# Patient Record
Sex: MALE | URBAN - METROPOLITAN AREA
[De-identification: names, ages, dates, MRNs, and addresses within clinical notes are randomized per-mention and may not be internally consistent; named-entity substitution may affect disease eponyms.]

---

## 2018-03-09 ENCOUNTER — RECORDS - HEALTHEAST (OUTPATIENT)
Dept: LAB | Facility: CLINIC | Age: 28
End: 2018-03-09

## 2018-03-12 LAB
C TRACH DNA SPEC QL PROBE+SIG AMP: NEGATIVE
N GONORRHOEA DNA SPEC QL NAA+PROBE: NEGATIVE

## 2019-10-04 ENCOUNTER — HOSPITAL ENCOUNTER (INPATIENT)
Age: 29
LOS: 2 days | Discharge: HOME | DRG: 753 | End: 2019-10-07
Attending: EMERGENCY MEDICINE | Admitting: PSYCHIATRY & NEUROLOGY

## 2019-10-04 ENCOUNTER — APPOINTMENT (OUTPATIENT)
Dept: GENERAL RADIOLOGY | Age: 29
DRG: 753 | End: 2019-10-04
Attending: PHYSICIAN ASSISTANT

## 2019-10-04 DIAGNOSIS — R45.851 SUICIDAL IDEATION: Primary | ICD-10-CM

## 2019-10-04 LAB
ALBUMIN SERPL-MCNC: 3.7 G/DL (ref 3.6–5.1)
ALBUMIN/GLOB SERPL: 1.1 {RATIO} (ref 1–2.4)
ALP SERPL-CCNC: 66 UNITS/L (ref 45–117)
ALT SERPL-CCNC: 19 UNITS/L
ANION GAP SERPL CALC-SCNC: 12 MMOL/L (ref 10–20)
AST SERPL-CCNC: 14 UNITS/L
BASOPHILS # BLD AUTO: 0 K/MCL (ref 0–0.3)
BASOPHILS NFR BLD AUTO: 0 %
BILIRUB SERPL-MCNC: 0.3 MG/DL (ref 0.2–1)
BUN SERPL-MCNC: 8 MG/DL (ref 6–20)
BUN/CREAT SERPL: 10 (ref 7–25)
CALCIUM SERPL-MCNC: 8.5 MG/DL (ref 8.4–10.2)
CHLORIDE SERPL-SCNC: 106 MMOL/L (ref 98–107)
CO2 SERPL-SCNC: 25 MMOL/L (ref 21–32)
CREAT SERPL-MCNC: 0.83 MG/DL (ref 0.67–1.17)
DIFFERENTIAL METHOD BLD: NORMAL
EOSINOPHIL # BLD AUTO: 0.1 K/MCL (ref 0.1–0.5)
EOSINOPHIL NFR SPEC: 1 %
ERYTHROCYTE [DISTWIDTH] IN BLOOD: 12.7 % (ref 11–15)
ETHANOL SERPL-MCNC: NORMAL MG/DL
GLOBULIN SER-MCNC: 3.3 G/DL (ref 2–4)
GLUCOSE SERPL-MCNC: 86 MG/DL (ref 65–99)
HCT VFR BLD CALC: 41.5 % (ref 39–51)
HGB BLD-MCNC: 13.6 G/DL (ref 13–17)
IMM GRANULOCYTES # BLD AUTO: 0 K/MCL (ref 0–0.2)
IMM GRANULOCYTES NFR BLD: 0 %
LIPASE SERPL-CCNC: <50 UNITS/L (ref 73–393)
LYMPHOCYTES # BLD MANUAL: 2.8 K/MCL (ref 1–4.8)
LYMPHOCYTES NFR BLD MANUAL: 38 %
MAGNESIUM SERPL-MCNC: 2.1 MG/DL (ref 1.7–2.4)
MCH RBC QN AUTO: 29.5 PG (ref 26–34)
MCHC RBC AUTO-ENTMCNC: 32.8 G/DL (ref 32–36.5)
MCV RBC AUTO: 90 FL (ref 78–100)
MONOCYTES # BLD MANUAL: 0.8 K/MCL (ref 0.3–0.9)
MONOCYTES NFR BLD MANUAL: 11 %
NEUTROPHILS # BLD: 3.6 K/MCL (ref 1.8–7.7)
NEUTROPHILS NFR BLD AUTO: 50 %
NRBC BLD MANUAL-RTO: 0 /100 WBC
PLATELET # BLD: 241 K/MCL (ref 140–450)
POTASSIUM SERPL-SCNC: 3.4 MMOL/L (ref 3.4–5.1)
PROT SERPL-MCNC: 7 G/DL (ref 6.4–8.2)
RBC # BLD: 4.61 MIL/MCL (ref 4.5–5.9)
SODIUM SERPL-SCNC: 140 MMOL/L (ref 135–145)
TROPONIN I BLD-MCNC: <0.1 NG/ML
WBC # BLD: 7.4 K/MCL (ref 4.2–11)

## 2019-10-04 PROCEDURE — 80329 ANALGESICS NON-OPIOID 1 OR 2: CPT

## 2019-10-04 PROCEDURE — 36415 COLL VENOUS BLD VENIPUNCTURE: CPT

## 2019-10-04 PROCEDURE — 99285 EMERGENCY DEPT VISIT HI MDM: CPT

## 2019-10-04 PROCEDURE — 71045 X-RAY EXAM CHEST 1 VIEW: CPT | Performed by: RADIOLOGY

## 2019-10-04 PROCEDURE — 71045 X-RAY EXAM CHEST 1 VIEW: CPT

## 2019-10-04 PROCEDURE — 83690 ASSAY OF LIPASE: CPT

## 2019-10-04 PROCEDURE — 93005 ELECTROCARDIOGRAM TRACING: CPT | Performed by: PHYSICIAN ASSISTANT

## 2019-10-04 PROCEDURE — 84484 ASSAY OF TROPONIN QUANT: CPT

## 2019-10-04 PROCEDURE — 80053 COMPREHEN METABOLIC PANEL: CPT

## 2019-10-04 PROCEDURE — 85025 COMPLETE CBC W/AUTO DIFF WBC: CPT

## 2019-10-04 PROCEDURE — 83735 ASSAY OF MAGNESIUM: CPT

## 2019-10-04 PROCEDURE — 80320 DRUG SCREEN QUANTALCOHOLS: CPT

## 2019-10-04 ASSESSMENT — ENCOUNTER SYMPTOMS
HEADACHES: 0
APPETITE CHANGE: 0
VOMITING: 0
NAUSEA: 0
WEAKNESS: 0
CHILLS: 0
CONSTIPATION: 0
ABDOMINAL PAIN: 0
DIARRHEA: 0
SORE THROAT: 0
COUGH: 0
FEVER: 0
HALLUCINATIONS: 0
BACK PAIN: 0
EYE REDNESS: 0
SHORTNESS OF BREATH: 0
BLOOD IN STOOL: 0

## 2019-10-04 ASSESSMENT — PAIN SCALES - GENERAL
PAINLEVEL_OUTOF10: 0
PAINLEVEL_OUTOF10: 0

## 2019-10-05 LAB
AMPHETAMINES UR QL: POSITIVE
APAP SERPL-MCNC: <2 MCG/ML (ref 10–30)
ATRIAL RATE (BPM): 91
BARBITURATES UR QL: NEGATIVE
BENZODIAZ UR QL: NEGATIVE
BZE UR QL: NEGATIVE
CANNABINOIDS UR QL SCN: POSITIVE
OPIATES UR QL: NEGATIVE
P AXIS (DEGREES): 50
PCP UR QL: NEGATIVE
PR-INTERVAL (MSEC): 132
QRS-INTERVAL (MSEC): 88
QT-INTERVAL (MSEC): 368
QTC: 453
R AXIS (DEGREES): 64
REPORT TEXT: NORMAL
SALICYLATES SERPL-MCNC: 2.9 MG/DL
T AXIS (DEGREES): 49
VENTRICULAR RATE EKG/MIN (BPM): 91

## 2019-10-05 PROCEDURE — 90792 PSYCH DIAG EVAL W/MED SRVCS: CPT | Performed by: PSYCHIATRY & NEUROLOGY

## 2019-10-05 PROCEDURE — 80307 DRUG TEST PRSMV CHEM ANLYZR: CPT

## 2019-10-05 PROCEDURE — 99252 IP/OBS CONSLTJ NEW/EST SF 35: CPT | Performed by: INTERNAL MEDICINE

## 2019-10-05 PROCEDURE — 10004577 HB ROOM CHARGE PSYCH

## 2019-10-05 PROCEDURE — 10004125 HB COUNTER-FIRST DAY ADMIT

## 2019-10-05 PROCEDURE — 10002803 HB RX 637: Performed by: PSYCHIATRY & NEUROLOGY

## 2019-10-05 PROCEDURE — 10002803 HB RX 637: Performed by: INTERNAL MEDICINE

## 2019-10-05 RX ORDER — ZOLPIDEM TARTRATE 5 MG/1
5 TABLET ORAL NIGHTLY PRN
Status: DISCONTINUED | OUTPATIENT
Start: 2019-10-05 | End: 2019-10-07 | Stop reason: HOSPADM

## 2019-10-05 RX ORDER — CLONAZEPAM 0.5 MG/1
0.5 TABLET ORAL EVERY 12 HOURS SCHEDULED
Status: DISCONTINUED | OUTPATIENT
Start: 2019-10-05 | End: 2019-10-07 | Stop reason: HOSPADM

## 2019-10-05 RX ORDER — NICOTINE 21 MG/24HR
1 PATCH, TRANSDERMAL 24 HOURS TRANSDERMAL DAILY
Status: DISCONTINUED | OUTPATIENT
Start: 2019-10-05 | End: 2019-10-07 | Stop reason: HOSPADM

## 2019-10-05 RX ORDER — BENZTROPINE MESYLATE 1 MG/1
1 TABLET ORAL EVERY 4 HOURS PRN
Status: DISCONTINUED | OUTPATIENT
Start: 2019-10-05 | End: 2019-10-07 | Stop reason: HOSPADM

## 2019-10-05 RX ORDER — LOPERAMIDE HYDROCHLORIDE 2 MG/1
2 CAPSULE ORAL PRN
Status: DISCONTINUED | OUTPATIENT
Start: 2019-10-05 | End: 2019-10-07 | Stop reason: HOSPADM

## 2019-10-05 RX ORDER — ONDANSETRON 4 MG/1
4 TABLET, ORALLY DISINTEGRATING ORAL 2 TIMES DAILY PRN
Status: DISCONTINUED | OUTPATIENT
Start: 2019-10-05 | End: 2019-10-07 | Stop reason: HOSPADM

## 2019-10-05 RX ORDER — LORAZEPAM 2 MG/ML
1 INJECTION INTRAMUSCULAR EVERY 4 HOURS PRN
Status: DISCONTINUED | OUTPATIENT
Start: 2019-10-05 | End: 2019-10-07 | Stop reason: HOSPADM

## 2019-10-05 RX ORDER — MAGNESIUM HYDROXIDE/ALUMINUM HYDROXICE/SIMETHICONE 120; 1200; 1200 MG/30ML; MG/30ML; MG/30ML
30 SUSPENSION ORAL EVERY 4 HOURS PRN
Status: DISCONTINUED | OUTPATIENT
Start: 2019-10-05 | End: 2019-10-07 | Stop reason: HOSPADM

## 2019-10-05 RX ORDER — OLANZAPINE 5 MG/1
5 TABLET, ORALLY DISINTEGRATING ORAL
Status: DISCONTINUED | OUTPATIENT
Start: 2019-10-05 | End: 2019-10-07 | Stop reason: HOSPADM

## 2019-10-05 RX ORDER — AMOXICILLIN 250 MG
2 CAPSULE ORAL 2 TIMES DAILY PRN
Status: DISCONTINUED | OUTPATIENT
Start: 2019-10-05 | End: 2019-10-07 | Stop reason: HOSPADM

## 2019-10-05 RX ORDER — LORAZEPAM 1 MG/1
1 TABLET ORAL EVERY 4 HOURS PRN
Status: DISCONTINUED | OUTPATIENT
Start: 2019-10-05 | End: 2019-10-07 | Stop reason: HOSPADM

## 2019-10-05 RX ORDER — BENZTROPINE MESYLATE 1 MG/ML
1 INJECTION INTRAMUSCULAR; INTRAVENOUS EVERY 4 HOURS PRN
Status: DISCONTINUED | OUTPATIENT
Start: 2019-10-05 | End: 2019-10-07 | Stop reason: HOSPADM

## 2019-10-05 RX ORDER — GABAPENTIN 300 MG/1
300 CAPSULE ORAL 3 TIMES DAILY
Status: ON HOLD | COMMUNITY
End: 2019-10-07 | Stop reason: SDUPTHER

## 2019-10-05 RX ORDER — POLYETHYLENE GLYCOL 3350 17 G/17G
17 POWDER, FOR SOLUTION ORAL DAILY PRN
Status: DISCONTINUED | OUTPATIENT
Start: 2019-10-05 | End: 2019-10-07 | Stop reason: HOSPADM

## 2019-10-05 RX ORDER — ACETAMINOPHEN 325 MG/1
650 TABLET ORAL EVERY 4 HOURS PRN
Status: DISCONTINUED | OUTPATIENT
Start: 2019-10-05 | End: 2019-10-07 | Stop reason: HOSPADM

## 2019-10-05 RX ORDER — ARIPIPRAZOLE 5 MG/1
5 TABLET ORAL DAILY
Status: ON HOLD | COMMUNITY
End: 2019-10-07 | Stop reason: HOSPADM

## 2019-10-05 RX ORDER — GABAPENTIN 300 MG/1
300 CAPSULE ORAL 3 TIMES DAILY
Status: DISCONTINUED | OUTPATIENT
Start: 2019-10-05 | End: 2019-10-07

## 2019-10-05 RX ORDER — HYDROXYZINE HYDROCHLORIDE 25 MG/1
50 TABLET, FILM COATED ORAL EVERY 4 HOURS PRN
Status: DISCONTINUED | OUTPATIENT
Start: 2019-10-05 | End: 2019-10-07 | Stop reason: HOSPADM

## 2019-10-05 RX ORDER — HALOPERIDOL 5 MG/ML
5 INJECTION INTRAMUSCULAR
Status: DISCONTINUED | OUTPATIENT
Start: 2019-10-05 | End: 2019-10-07 | Stop reason: HOSPADM

## 2019-10-05 RX ORDER — HALOPERIDOL 5 MG/1
10 TABLET ORAL
Status: DISCONTINUED | OUTPATIENT
Start: 2019-10-05 | End: 2019-10-07 | Stop reason: HOSPADM

## 2019-10-05 RX ADMIN — NICOTINE 1 PATCH: 21 PATCH TRANSDERMAL at 08:45

## 2019-10-05 RX ADMIN — GABAPENTIN 300 MG: 300 CAPSULE ORAL at 13:02

## 2019-10-05 RX ADMIN — CLONAZEPAM 0.5 MG: 0.5 TABLET ORAL at 20:43

## 2019-10-05 RX ADMIN — GABAPENTIN 300 MG: 300 CAPSULE ORAL at 20:43

## 2019-10-05 RX ADMIN — CLONAZEPAM 0.5 MG: 0.5 TABLET ORAL at 13:02

## 2019-10-05 ASSESSMENT — COGNITIVE AND FUNCTIONAL STATUS - GENERAL
LEVEL_OF_CONSCIOUSNESS_CALCULATED: HYPERALERT
AFFECT: FLAT;IRRITABLE
MEMORY: INTACT
ARE YOU BLIND OR DO YOU HAVE SERIOUS DIFFICULTY SEEING, EVEN WHEN WEARING GLASSES: NO
BEHAVIOR: SUICIDAL/SUICIDAL IDEATION
SPEECH: GUARDED
ORIENTATION: ORIENTED (PERSON/PLACE/TIME)
MOOD: IRRITABLE
ARE YOU DEAF OR DO YOU HAVE SERIOUS DIFFICULTY  HEARING: NO

## 2019-10-05 ASSESSMENT — ACTIVITIES OF DAILY LIVING (ADL)
ADL_SHORT_OF_BREATH: NO
RECENT_DECLINE_ADL: NO
CHRONIC_PAIN_PRESENT: YES, CHRONIC
DESCRIBE HOW PAIN IMPACTS YOUR LIFE: NONE/CAN MANAGE
ADL_SCORE: 12
ADL_BEFORE_ADMISSION: INDEPENDENT

## 2019-10-05 ASSESSMENT — COLUMBIA-SUICIDE SEVERITY RATING SCALE - C-SSRS
LETHALITY/MEDICAL DAMAGE CODE MOST RECENT ACTUAL ATTEMPT: MINOR PHYSICAL DAMAGE
REASONS FOR IDEATION LIFETIME: COMPLETELY TO END OR STOP THE PAIN (YOU COULDN'T GO ON LIVING WITH THE PAIN OR HOW YOU WERE FEELING)
TOTAL  NUMBER OF INTERRUPTED ATTEMPTS LIFETIME: NO
TOTAL  NUMBER OF INTERRUPTED ATTEMPTS PAST 3 MONTHS: NO
ATTEMPT PAST THREE MONTHS: NO
6. HAVE YOU EVER DONE ANYTHING, STARTED TO DO ANYTHING, OR PREPARED TO DO ANYTHING TO END YOUR LIFE?: NO
REASONS FOR IDEATION PAST MONTH: EQUALLY TO GET ATTENTION, REVENGE OR A REACTION FROM OTHERS AND TO END/STOP THE PAIN
TOTAL  NUMBER OF ABORTED OR SELF INTERRUPTED ATTEMPTS PAST 3 MONTHS: NO
ATTEMPT LIFETIME: YES
6. HAVE YOU EVER DONE ANYTHING, STARTED TO DO ANYTHING, OR PREPARED TO DO ANYTHING TO END YOUR LIFE?: NO

## 2019-10-05 ASSESSMENT — LIFESTYLE VARIABLES
ALCOHOL_USE_STATUS: NO OR LOW RISK WITH VALIDATED TOOL
HOW MANY STANDARD DRINKS CONTAINING ALCOHOL DO YOU HAVE ON A TYPICAL DAY: 0,1 OR 2
HOW OFTEN DO YOU HAVE A DRINK CONTAINING ALCOHOL: NEVER
HOW OFTEN DO YOU HAVE 6 OR MORE DRINKS ON ONE OCCASION: NEVER
HOW MANY STANDARD DRINKS CONTAINING ALCOHOL DO YOU HAVE ON A TYPICAL DAY: 0,1 OR 2
AUDIT-C TOTAL SCORE: 0
AUDIT-C TOTAL SCORE: 0
ALCOHOL_USE_STATUS: NO OR LOW RISK WITH VALIDATED TOOL
HAVE YOU EVER BEEN EXPOSED TO OTHER VERY DISTURBING, TRAUMATIC OR ANXIETY PROVOKING EVENTS IN YOUR LIFETIME?: NO
HAVE YOU EVER BEEN VERBALLY, EMOTIONALLY, PHYSICALLY OR SEXUALLY ABUSED, OR ABUSED VIA SOCIAL MEDIA?: NO
ALCOHOL_USE: DENIES
HOW OFTEN DO YOU HAVE 6 OR MORE DRINKS ON ONE OCCASION: NEVER
HOW OFTEN DO YOU HAVE A DRINK CONTAINING ALCOHOL: NEVER

## 2019-10-05 ASSESSMENT — PAIN SCALES - GENERAL
PAINLEVEL_OUTOF10: 8
PAINLEVEL_OUTOF10: 0
PAINLEVEL_OUTOF10: 7
PAINLEVEL_OUTOF10: 0

## 2019-10-06 PROCEDURE — 10002803 HB RX 637: Performed by: INTERNAL MEDICINE

## 2019-10-06 PROCEDURE — 10004577 HB ROOM CHARGE PSYCH

## 2019-10-06 PROCEDURE — 10002803 HB RX 637: Performed by: PSYCHIATRY & NEUROLOGY

## 2019-10-06 PROCEDURE — 99232 SBSQ HOSP IP/OBS MODERATE 35: CPT | Performed by: PSYCHIATRY & NEUROLOGY

## 2019-10-06 PROCEDURE — 10004185 HB COUNTER-VISIT  CENSUS

## 2019-10-06 RX ADMIN — GABAPENTIN 300 MG: 300 CAPSULE ORAL at 09:10

## 2019-10-06 RX ADMIN — NICOTINE POLACRILEX 2 MG: 2 GUM, CHEWING BUCCAL at 18:48

## 2019-10-06 RX ADMIN — CLONAZEPAM 0.5 MG: 0.5 TABLET ORAL at 20:31

## 2019-10-06 RX ADMIN — GABAPENTIN 300 MG: 300 CAPSULE ORAL at 13:35

## 2019-10-06 RX ADMIN — ZOLPIDEM TARTRATE 5 MG: 5 TABLET, COATED ORAL at 20:35

## 2019-10-06 RX ADMIN — GABAPENTIN 300 MG: 300 CAPSULE ORAL at 20:31

## 2019-10-06 RX ADMIN — NICOTINE 1 PATCH: 21 PATCH TRANSDERMAL at 09:10

## 2019-10-06 RX ADMIN — CLONAZEPAM 0.5 MG: 0.5 TABLET ORAL at 09:10

## 2019-10-06 RX ADMIN — LORAZEPAM 1 MG: 1 TABLET ORAL at 17:40

## 2019-10-06 RX ADMIN — NICOTINE POLACRILEX 2 MG: 2 GUM, CHEWING BUCCAL at 12:32

## 2019-10-06 ASSESSMENT — PATIENT HEALTH QUESTIONNAIRE - PHQ9
SUM OF ALL RESPONSES TO PHQ QUESTIONS 1-9: 26
4. FEELING TIRED OR HAVING LITTLE ENERGY: NEARLY EVERY DAY
6. FEELING BAD ABOUT YOURSELF - OR THAT YOU ARE A FAILURE OR HAVE LET YOURSELF OR YOUR FAMILY DOWN: NEARLY EVERY DAY
8. MOVING OR SPEAKING SO SLOWLY THAT OTHER PEOPLE COULD HAVE NOTICED. OR THE OPPOSITE, BEING SO FIGETY OR RESTLESS THAT YOU HAVE BEEN MOVING AROUND A LOT MORE THAN USUAL: NEARLY EVERY DAY
9. THOUGHTS THAT YOU WOULD BE BETTER OFF DEAD, OR OF HURTING YOURSELF: NEARLY EVERY DAY
1. LITTLE INTEREST OR PLEASURE IN DOING THINGS: NEARLY EVERY DAY
5. POOR APPETITE OR OVEREATING: NEARLY EVERY DAY
7. TROUBLE CONCENTRATING ON THINGS, SUCH AS READING THE NEWSPAPER OR WATCHING TELEVISION: NEARLY EVERY DAY
3. TROUBLE FALLING OR STAYING ASLEEP OR SLEEPING TOO MUCH: NEARLY EVERY DAY
10. IF YOU CHECKED OFF ANY PROBLEMS, HOW DIFFICULT HAVE THESE PROBLEMS MADE IT FOR YOU TO DO YOUR WORK, TAKE CARE OF THINGS AT HOME, OR GET ALONG WITH OTHER PEOPLE: EXTREMELY DIFFICULT
2. FEELING DOWN, DEPRESSED OR HOPELESS: MORE THAN HALF THE DAYS

## 2019-10-06 ASSESSMENT — ANXIETY QUESTIONNAIRES
3. WORRYING TOO MUCH ABOUT DIFFERENT THINGS: 3 - NEARLY EVERY DAY
1. FEELING NERVOUS, ANXIOUS, OR ON EDGE: 3 - NEARLY EVERY DAY
6. BECOMING EASILY ANNOYED OR IRRITABLE: 3 - NEARLY EVERY DAY
2. NOT BEING ABLE TO STOP OR CONTROL WORRYING: 3 - NEARLY EVERY DAY
GAD7 TOTAL SCORE: 21
4. TROUBLE RELAXING: 3 - NEARLY EVERY DAY
5. BEING SO RESTLESS THAT IT IS HARD TO SIT STILL: 3 - NEARLY EVERY DAY
7. FEELING AFRAID AS IF SOMETHING AWFUL MIGHT HAPPEN: 3 - NEARLY EVERY DAY

## 2019-10-06 ASSESSMENT — PAIN SCALES - GENERAL: PAINLEVEL_OUTOF10: 0

## 2019-10-07 VITALS
DIASTOLIC BLOOD PRESSURE: 71 MMHG | HEART RATE: 85 BPM | WEIGHT: 188.4 LBS | OXYGEN SATURATION: 98 % | SYSTOLIC BLOOD PRESSURE: 118 MMHG | BODY MASS INDEX: 27.91 KG/M2 | TEMPERATURE: 97.7 F | RESPIRATION RATE: 16 BRPM | HEIGHT: 69 IN

## 2019-10-07 PROCEDURE — 99239 HOSP IP/OBS DSCHRG MGMT >30: CPT | Performed by: PSYCHIATRY & NEUROLOGY

## 2019-10-07 PROCEDURE — 10004172 HB COUNTER-THERAPY VISIT OT

## 2019-10-07 PROCEDURE — 10002803 HB RX 637: Performed by: INTERNAL MEDICINE

## 2019-10-07 PROCEDURE — 10004185 HB COUNTER-VISIT  CENSUS

## 2019-10-07 PROCEDURE — 10002803 HB RX 637: Performed by: PSYCHIATRY & NEUROLOGY

## 2019-10-07 RX ORDER — CLONAZEPAM 0.5 MG/1
0.5 TABLET ORAL 2 TIMES DAILY PRN
Qty: 30 TABLET | Refills: 0 | Status: SHIPPED | OUTPATIENT
Start: 2019-10-07 | End: 2019-10-07

## 2019-10-07 RX ORDER — CLONAZEPAM 0.5 MG/1
0.5 TABLET ORAL 2 TIMES DAILY PRN
Qty: 60 TABLET | Refills: 0 | Status: SHIPPED | OUTPATIENT
Start: 2019-10-07 | End: 2019-10-07

## 2019-10-07 RX ORDER — GABAPENTIN 300 MG/1
300 CAPSULE ORAL 3 TIMES DAILY
Qty: 30 CAPSULE | Refills: 0 | Status: SHIPPED | OUTPATIENT
Start: 2019-10-07 | End: 2019-10-07

## 2019-10-07 RX ORDER — GABAPENTIN 300 MG/1
CAPSULE ORAL
Qty: 60 CAPSULE | Refills: 1 | Status: SHIPPED | OUTPATIENT
Start: 2019-10-07 | End: 2019-10-07 | Stop reason: HOSPADM

## 2019-10-07 RX ORDER — GABAPENTIN 300 MG/1
300 CAPSULE ORAL 3 TIMES DAILY
Qty: 30 CAPSULE | Refills: 0 | Status: SHIPPED | OUTPATIENT
Start: 2019-10-07

## 2019-10-07 RX ORDER — GABAPENTIN 400 MG/1
400 CAPSULE ORAL 3 TIMES DAILY
Status: DISCONTINUED | OUTPATIENT
Start: 2019-10-07 | End: 2019-10-07 | Stop reason: HOSPADM

## 2019-10-07 RX ORDER — CLONAZEPAM 0.5 MG/1
0.5 TABLET ORAL 2 TIMES DAILY PRN
Qty: 30 TABLET | Refills: 0 | Status: SHIPPED | OUTPATIENT
Start: 2019-10-07

## 2019-10-07 RX ORDER — LURASIDONE HYDROCHLORIDE 20 MG/1
20 TABLET, FILM COATED ORAL
Status: DISCONTINUED | OUTPATIENT
Start: 2019-10-07 | End: 2019-10-07 | Stop reason: HOSPADM

## 2019-10-07 RX ADMIN — CLONAZEPAM 0.5 MG: 0.5 TABLET ORAL at 08:29

## 2019-10-07 RX ADMIN — NICOTINE 1 PATCH: 21 PATCH TRANSDERMAL at 08:29

## 2019-10-07 RX ADMIN — GABAPENTIN 400 MG: 400 CAPSULE ORAL at 14:01

## 2019-10-07 RX ADMIN — ZOLPIDEM TARTRATE 5 MG: 5 TABLET, COATED ORAL at 02:23

## 2019-10-07 RX ADMIN — GABAPENTIN 300 MG: 300 CAPSULE ORAL at 08:29

## 2019-10-07 RX ADMIN — LORAZEPAM 1 MG: 1 TABLET ORAL at 14:10

## 2019-10-07 ASSESSMENT — LIFESTYLE VARIABLES
HOW OFTEN HAVE YOU BEEN INVOLVED IN ANY CRIMINAL OR ILLEGAL ACTIVITIES SUCH AS DRIVING A MOTOR VEHICLE UNDER THE INFLUENCE OF ALCOHOL OR DRUGS, ASSAULT, SHOPLIFTING, SUPPLYING AN ILLICIT SUBSTANCE TO ANOTHER PERSON (DO NOT INCLUDE USING ILLEGAL DRUGS).: DENIES
AMPHETAMINES/STIMULANTS USE: YES
HANDLING NEGATIVE FEELINGS AND REACTING TO LIFE'S UPS AND DOWNS WITHOUT USING ALCOHOL OR DRUGS: DENIES
E-CIGARETTE_USE: NO E-CIGARETTES USE IN THE LAST 30 DAYS
VOLATILE SOLVENTS/INHALANTS USE: DENIES
COCAINE USE: DENIES

## 2019-10-15 ENCOUNTER — APPOINTMENT (OUTPATIENT)
Dept: ULTRASOUND IMAGING | Age: 29
End: 2019-10-15

## 2019-10-15 ENCOUNTER — HOSPITAL ENCOUNTER (EMERGENCY)
Age: 29
Discharge: HOME | End: 2019-10-15

## 2019-10-15 VITALS
HEIGHT: 69 IN | BODY MASS INDEX: 30.51 KG/M2 | WEIGHT: 206.02 LBS | OXYGEN SATURATION: 98 % | RESPIRATION RATE: 18 BRPM | TEMPERATURE: 98.3 F | DIASTOLIC BLOOD PRESSURE: 67 MMHG | HEART RATE: 85 BPM | SYSTOLIC BLOOD PRESSURE: 118 MMHG

## 2019-10-15 DIAGNOSIS — N50.812 TESTICULAR PAIN, LEFT: ICD-10-CM

## 2019-10-15 DIAGNOSIS — M54.6 BACK PAIN OF THORACOLUMBAR REGION: Primary | ICD-10-CM

## 2019-10-15 DIAGNOSIS — M54.50 BACK PAIN OF THORACOLUMBAR REGION: Primary | ICD-10-CM

## 2019-10-15 LAB
APPEARANCE UR: CLEAR
BILIRUB UR QL STRIP: NEGATIVE
COLOR UR: YELLOW
GLUCOSE UR STRIP-MCNC: NEGATIVE MG/DL
HGB UR QL STRIP: NEGATIVE
KETONES UR STRIP-MCNC: NEGATIVE MG/DL
LEUKOCYTE ESTERASE UR QL STRIP: NEGATIVE
NITRITE UR QL STRIP: NEGATIVE
PH UR STRIP: 7 UNITS (ref 5–7)
PROT UR STRIP-MCNC: NEGATIVE MG/DL
SP GR UR STRIP: 1.02 (ref 1–1.03)
SPECIMEN SOURCE: NORMAL
UROBILINOGEN UR STRIP-MCNC: 0.2 MG/DL (ref 0–1)

## 2019-10-15 PROCEDURE — 10004651 HB RX, NO CHARGE ITEM: Performed by: NURSE PRACTITIONER

## 2019-10-15 PROCEDURE — 10002803 HB RX 637: Performed by: NURSE PRACTITIONER

## 2019-10-15 PROCEDURE — 76870 US EXAM SCROTUM: CPT | Performed by: RADIOLOGY

## 2019-10-15 PROCEDURE — 99284 EMERGENCY DEPT VISIT MOD MDM: CPT

## 2019-10-15 PROCEDURE — 81003 URINALYSIS AUTO W/O SCOPE: CPT

## 2019-10-15 PROCEDURE — 76870 US EXAM SCROTUM: CPT

## 2019-10-15 PROCEDURE — 99284 EMERGENCY DEPT VISIT MOD MDM: CPT | Performed by: NURSE PRACTITIONER

## 2019-10-15 PROCEDURE — 87491 CHLMYD TRACH DNA AMP PROBE: CPT

## 2019-10-15 PROCEDURE — 96372 THER/PROPH/DIAG INJ SC/IM: CPT | Performed by: NURSE PRACTITIONER

## 2019-10-15 PROCEDURE — 10002800 HB RX 250 W HCPCS: Performed by: NURSE PRACTITIONER

## 2019-10-15 PROCEDURE — 10002801 HB RX 250 W/O HCPCS: Performed by: NURSE PRACTITIONER

## 2019-10-15 RX ORDER — LIDOCAINE 4 G/G
1 PATCH TOPICAL DAILY
Status: DISCONTINUED | OUTPATIENT
Start: 2019-10-15 | End: 2019-10-15 | Stop reason: HOSPADM

## 2019-10-15 RX ORDER — ACETAMINOPHEN 325 MG/1
650 TABLET ORAL ONCE
Status: COMPLETED | OUTPATIENT
Start: 2019-10-15 | End: 2019-10-15

## 2019-10-15 RX ORDER — LEVOFLOXACIN 500 MG/1
500 TABLET, FILM COATED ORAL DAILY
Qty: 10 TABLET | Refills: 0 | Status: SHIPPED | OUTPATIENT
Start: 2019-10-15 | End: 2019-10-25

## 2019-10-15 RX ORDER — DIAZEPAM 5 MG/1
5 TABLET ORAL ONCE
Status: COMPLETED | OUTPATIENT
Start: 2019-10-15 | End: 2019-10-15

## 2019-10-15 RX ADMIN — ACETAMINOPHEN 650 MG: 325 TABLET ORAL at 03:03

## 2019-10-15 RX ADMIN — DIAZEPAM 5 MG: 5 TABLET ORAL at 03:04

## 2019-10-15 RX ADMIN — LIDOCAINE 1 PATCH: 246 PATCH TOPICAL at 03:41

## 2019-10-15 RX ADMIN — LIDOCAINE HYDROCHLORIDE 250 MG: 10 INJECTION, SOLUTION INFILTRATION; PERINEURAL at 04:59

## 2019-10-15 SDOH — HEALTH STABILITY: MENTAL HEALTH: HOW OFTEN DO YOU HAVE A DRINK CONTAINING ALCOHOL?: NEVER

## 2019-10-15 ASSESSMENT — PAIN DESCRIPTION - PAIN TYPE
TYPE: CHRONIC PAIN;ACUTE PAIN
TYPE: CHRONIC PAIN;ACUTE PAIN

## 2019-10-15 ASSESSMENT — PAIN SCALES - GENERAL
PAINLEVEL_OUTOF10: 9
PAINLEVEL_OUTOF10: 6
PAINLEVEL_OUTOF10: 7

## 2019-10-16 LAB
C TRACH RRNA SPEC QL NAA+PROBE: NEGATIVE
N GONORRHOEA RRNA SPEC QL NAA+PROBE: NEGATIVE
SPECIMEN SOURCE: NORMAL

## 2020-03-21 ENCOUNTER — HOSPITAL ENCOUNTER (EMERGENCY)
Facility: CLINIC | Age: 30
Discharge: HOME | End: 2020-03-21
Attending: EMERGENCY MEDICINE
Payer: COMMERCIAL

## 2020-03-21 VITALS
DIASTOLIC BLOOD PRESSURE: 93 MMHG | OXYGEN SATURATION: 96 % | RESPIRATION RATE: 12 BRPM | SYSTOLIC BLOOD PRESSURE: 154 MMHG | HEART RATE: 103 BPM | WEIGHT: 188.2 LBS | TEMPERATURE: 98.2 F

## 2020-03-21 DIAGNOSIS — M79.641 BILATERAL HAND PAIN: ICD-10-CM

## 2020-03-21 DIAGNOSIS — M79.642 BILATERAL HAND PAIN: ICD-10-CM

## 2020-03-21 RX ORDER — GABAPENTIN 800 MG/1
800 TABLET ORAL 3 TIMES DAILY
COMMUNITY

## 2020-03-21 ASSESSMENT — ENCOUNTER SYMPTOMS
SHORTNESS OF BREATH: 0
HEADACHES: 0
ARTHRALGIAS: 0
FEVER: 0
DIFFICULTY URINATING: 0
NECK STIFFNESS: 0
EYE REDNESS: 0
COLOR CHANGE: 0
ABDOMINAL PAIN: 0
CONFUSION: 0

## 2020-03-21 NOTE — ED AVS SNAPSHOT
[facility], [place], Emergency Department  [address]  [facility] [address]  Phone:  [phone]  Fax:  [phone]                                    Jf Fernando   MRN: [id number]    Department:  [facility], Emergency Department   Date of Visit:  3/21/2020           After Visit Summary Signature Page    I have received my discharge instructions, and my questions have been answered. I have discussed any challenges I see with this plan with the nurse or doctor.    ..........................................................................................................................................  Patient/Patient Representative Signature      ..........................................................................................................................................  Patient Representative Print Name and Relationship to Patient    ..................................................               ................................................  Date                                   Time    ..........................................................................................................................................  Reviewed by Signature/Title    ...................................................              ..............................................  Date                                               Time          22EPIC Rev 08/18

## 2020-03-22 NOTE — ED PROVIDER NOTES
"    [facility]ERGENCY DE[facility]MENT ([facility])  3/21/20    History     Chief Complaint   Patient presents with     Hand Pain     burning and tingling sensation bilateral 1-4 digits      The history is provided by the patient.     Jf Fernando is a 29 year old male with no significant past medical history who presents here to the Emergency Department due to bilateral hand pain. Patient reports that he has had bilateral hand pain from his thumbs to the ring fingers for the past 2 days. Describes the pain as a burning/tingling sensation. Notes the pain is persistent. Patient believes this might be due to frostbite. States that he is homeless. Reports that he has not had mittens until [facility]. Patient has a shelter to stay at [facility]. Denies neck trauma. Reports that his diet is \"okay\" but he doesn't eat pork. He does not have a PCP.    I have reviewed the Medications, Allergies, Past Medical and Surgical History, and Social History in the [email or web address] system.  PAST MEDICAL HISTORY: History reviewed. No pertinent past medical history.    PAST SURGICAL HISTORY: No past surgical history on file.    Past medical history, past surgical history, medications, and allergies were reviewed with the patient. Additional pertinent items: None    FAMILY HISTORY: No family history on file.    SOCIAL HISTORY:   Social History     Tobacco Use     Smoking status: Not on file   Substance Use Topics     Alcohol use: Not on file     Social history was reviewed with the patient. Additional pertinent items: None      Discharge Medication List as of 3/21/2020 10:25 PM      CONTINUE these medications which have NOT CHANGED    Details   gabapentin (NEURONTIN) 800 MG tablet Take 800 mg by mouth 3 times daily, Historical                Allergies   Allergen Reactions     Toradol [Ketorolac]         Review of Systems   Constitutional: Negative for fever.   HENT: Negative for congestion.    Eyes: Negative for redness.   Respiratory: Negative for " shortness of breath.    Cardiovascular: Negative for chest pain.   Gastrointestinal: Negative for abdominal pain.   Genitourinary: Negative for difficulty urinating.   Musculoskeletal: Negative for arthralgias and neck stiffness.        Positive for bilateral hand pain   Skin: Negative for color change.   Neurological: Negative for headaches.   Psychiatric/Behavioral: Negative for confusion.   All other systems reviewed and are negative.    A complete review of systems was performed with pertinent positives and negatives noted in the HPI, and all other systems negative.    Physical Exam   BP: (!) 154/93  Pulse: 103  Temp: 98.2  F (36.8  C)  Resp: 12  Weight: 85.4 kg (188 lb 3.2 oz)  SpO2: 96 %      Physical Exam  Constitutional:       General: He is not in acute distress.     Appearance: He is not diaphoretic.   HENT:      Head: Atraumatic.   Eyes:      General: No scleral icterus.     Pupils: Pupils are equal, round, and reactive to light.   Cardiovascular:      Heart sounds: Normal heart sounds.   Pulmonary:      Effort: No respiratory distress.      Breath sounds: Normal breath sounds.   Abdominal:      General: Bowel sounds are normal.      Palpations: Abdomen is soft.      Tenderness: There is no abdominal tenderness.   Musculoskeletal:         General: No tenderness.   Skin:     General: Skin is warm.      Findings: No rash.         ED Course   10:11 PM  The patient was seen and examined by Dakota Villasenor DO in Room ED04.        Procedures                           No results found for this or any previous visit (from the past 24 hour(s)).  Medications - No data to display          Assessments & Plan (with Medical Decision Making)         I have reviewed the nursing notes.    I have reviewed the findings, diagnosis, plan and need for follow up with the patient.    Discharge Medication List as of 3/21/2020 10:25 PM          Final diagnoses:   Bilateral hand pain     ITrent, am serving as a trained  medical scribe to document services personally performed by Dakota Villasenor DO, based on the provider's statements to me.   I, Dakota Villasenor DO, was physically present and have reviewed and verified the accuracy of this note documented by Trent Pang.    3/21/2020   [facility], [place], EMERGENCY DEPARTMENT     Dakota Villasenor MD  04/11/20 [id number]

## 2020-04-08 ENCOUNTER — APPOINTMENT (OUTPATIENT)
Dept: BEHAVIORAL HEALTH | Age: 30
End: 2020-04-08

## 2020-09-07 ENCOUNTER — APPOINTMENT (OUTPATIENT)
Dept: GENERAL RADIOLOGY | Facility: CLINIC | Age: 30
End: 2020-09-07
Attending: EMERGENCY MEDICINE
Payer: COMMERCIAL

## 2020-09-07 ENCOUNTER — HOSPITAL ENCOUNTER (EMERGENCY)
Facility: CLINIC | Age: 30
End: 2020-09-07
Attending: EMERGENCY MEDICINE | Admitting: EMERGENCY MEDICINE
Payer: COMMERCIAL

## 2020-09-07 VITALS — HEART RATE: 111 BPM | WEIGHT: 198.41 LBS | OXYGEN SATURATION: 43 %

## 2020-09-07 DIAGNOSIS — R57.8 HEMORRHAGIC SHOCK (H): ICD-10-CM

## 2020-09-07 DIAGNOSIS — I46.9 CARDIAC ARREST (H): ICD-10-CM

## 2020-09-07 LAB
ABO + RH BLD: NORMAL
ABO + RH BLD: NORMAL
ALBUMIN SERPL-MCNC: 2.9 G/DL (ref 3.4–5)
ALP SERPL-CCNC: 69 U/L (ref 40–150)
ALT SERPL W P-5'-P-CCNC: 22 U/L (ref 0–70)
ANION GAP SERPL CALCULATED.3IONS-SCNC: 22 MMOL/L (ref 3–14)
APTT PPP: 56 SEC (ref 22–37)
AST SERPL W P-5'-P-CCNC: 17 U/L (ref 0–45)
BASE DEFICIT BLDV-SCNC: 22.9 MMOL/L
BASOPHILS # BLD AUTO: 0 10E9/L (ref 0–0.2)
BASOPHILS NFR BLD AUTO: 0 %
BILIRUB DIRECT SERPL-MCNC: <0.1 MG/DL (ref 0–0.2)
BILIRUB SERPL-MCNC: 0.3 MG/DL (ref 0.2–1.3)
BLD GP AB SCN SERPL QL: NORMAL
BLD PROD TYP BPU: NORMAL
BLD UNIT ID BPU: 0
BLOOD BANK CMNT PATIENT-IMP: NORMAL
BLOOD PRODUCT CODE: NORMAL
BPU ID: NORMAL
BUN SERPL-MCNC: 8 MG/DL (ref 7–30)
BURR CELLS BLD QL SMEAR: SLIGHT
CALCIUM SERPL-MCNC: 8.6 MG/DL (ref 8.5–10.1)
CHLORIDE SERPL-SCNC: 106 MMOL/L (ref 94–109)
CO2 SERPL-SCNC: 13 MMOL/L (ref 20–32)
CREAT SERPL-MCNC: 1.52 MG/DL (ref 0.66–1.25)
DIFFERENTIAL METHOD BLD: ABNORMAL
EOSINOPHIL # BLD AUTO: 0.1 10E9/L (ref 0–0.7)
EOSINOPHIL NFR BLD AUTO: 1 %
ERYTHROCYTE [DISTWIDTH] IN BLOOD BY AUTOMATED COUNT: 12.7 % (ref 10–15)
GFR SERPL CREATININE-BSD FRML MDRD: 60 ML/MIN/{1.73_M2}
GLUCOSE BLDC GLUCOMTR-MCNC: 196 MG/DL (ref 70–99)
GLUCOSE SERPL-MCNC: 280 MG/DL (ref 70–99)
HCO3 BLDV-SCNC: 12 MMOL/L (ref 21–28)
HCT VFR BLD AUTO: 35.9 % (ref 40–53)
HGB BLD-MCNC: 10.3 G/DL (ref 13.3–17.7)
INR PPP: 2.06 (ref 0.86–1.14)
LACTATE BLD-SCNC: 18 MMOL/L (ref 0.7–2)
LYMPHOCYTES # BLD AUTO: 8.2 10E9/L (ref 0.8–5.3)
LYMPHOCYTES NFR BLD AUTO: 67 %
MCH RBC QN AUTO: 29.9 PG (ref 26.5–33)
MCHC RBC AUTO-ENTMCNC: 28.7 G/DL (ref 31.5–36.5)
MCV RBC AUTO: 104 FL (ref 78–100)
METAMYELOCYTES # BLD: 0.6 10E9/L
METAMYELOCYTES NFR BLD MANUAL: 5 %
MONOCYTES # BLD AUTO: 0.6 10E9/L (ref 0–1.3)
MONOCYTES NFR BLD AUTO: 5 %
NEUTROPHILS # BLD AUTO: 2.7 10E9/L (ref 1.6–8.3)
NEUTROPHILS NFR BLD AUTO: 22 %
NUM BPU REQUESTED: 10
NUM BPU REQUESTED: 2
O2/TOTAL GAS SETTING VFR VENT: ABNORMAL %
OXYHGB MFR BLDV: 23 %
PCO2 BLDV: 85 MM HG (ref 40–50)
PH BLDV: 6.78 PH (ref 7.32–7.43)
PLATELET # BLD AUTO: 59 10E9/L (ref 150–450)
PLATELET # BLD EST: ABNORMAL 10*3/UL
PO2 BLDV: 33 MM HG (ref 25–47)
POTASSIUM SERPL-SCNC: 4.8 MMOL/L (ref 3.4–5.3)
PROT SERPL-MCNC: 6.2 G/DL (ref 6.8–8.8)
RBC # BLD AUTO: 3.44 10E12/L (ref 4.4–5.9)
SODIUM SERPL-SCNC: 141 MMOL/L (ref 133–144)
SPECIMEN EXP DATE BLD: NORMAL
TRANSFUSION STATUS PATIENT QL: NORMAL
TROPONIN I SERPL-MCNC: 0.04 UG/L (ref 0–0.04)
WBC # BLD AUTO: 12.3 10E9/L (ref 4–11)

## 2020-09-07 PROCEDURE — 86923 COMPATIBILITY TEST ELECTRIC: CPT | Performed by: EMERGENCY MEDICINE

## 2020-09-07 PROCEDURE — 40000276 ZZH STATISTIC RCP TIME ED VENT EA 10 MIN

## 2020-09-07 PROCEDURE — P9016 RBC LEUKOCYTES REDUCED: HCPCS | Performed by: EMERGENCY MEDICINE

## 2020-09-07 PROCEDURE — 82805 BLOOD GASES W/O2 SATURATION: CPT | Performed by: EMERGENCY MEDICINE

## 2020-09-07 PROCEDURE — 76705 ECHO EXAM OF ABDOMEN: CPT

## 2020-09-07 PROCEDURE — 36680 INSERT NEEDLE BONE CAVITY: CPT

## 2020-09-07 PROCEDURE — 99291 CRITICAL CARE FIRST HOUR: CPT | Mod: 25

## 2020-09-07 PROCEDURE — 87040 BLOOD CULTURE FOR BACTERIA: CPT | Performed by: EMERGENCY MEDICINE

## 2020-09-07 PROCEDURE — 80076 HEPATIC FUNCTION PANEL: CPT | Performed by: EMERGENCY MEDICINE

## 2020-09-07 PROCEDURE — 83605 ASSAY OF LACTIC ACID: CPT | Performed by: EMERGENCY MEDICINE

## 2020-09-07 PROCEDURE — P9037 PLATE PHERES LEUKOREDU IRRAD: HCPCS | Performed by: EMERGENCY MEDICINE

## 2020-09-07 PROCEDURE — 31500 INSERT EMERGENCY AIRWAY: CPT

## 2020-09-07 PROCEDURE — 32551 INSERTION OF CHEST TUBE: CPT

## 2020-09-07 PROCEDURE — 36620 INSERTION CATHETER ARTERY: CPT

## 2020-09-07 PROCEDURE — 85610 PROTHROMBIN TIME: CPT | Performed by: EMERGENCY MEDICINE

## 2020-09-07 PROCEDURE — 76604 US EXAM CHEST: CPT

## 2020-09-07 PROCEDURE — 84484 ASSAY OF TROPONIN QUANT: CPT | Performed by: EMERGENCY MEDICINE

## 2020-09-07 PROCEDURE — 92950 HEART/LUNG RESUSCITATION CPR: CPT

## 2020-09-07 PROCEDURE — 80048 BASIC METABOLIC PNL TOTAL CA: CPT | Performed by: EMERGENCY MEDICINE

## 2020-09-07 PROCEDURE — 71045 X-RAY EXAM CHEST 1 VIEW: CPT

## 2020-09-07 PROCEDURE — 36556 INSERT NON-TUNNEL CV CATH: CPT

## 2020-09-07 PROCEDURE — 85025 COMPLETE CBC W/AUTO DIFF WBC: CPT | Performed by: EMERGENCY MEDICINE

## 2020-09-07 PROCEDURE — 86850 RBC ANTIBODY SCREEN: CPT | Performed by: EMERGENCY MEDICINE

## 2020-09-07 PROCEDURE — 96374 THER/PROPH/DIAG INJ IV PUSH: CPT

## 2020-09-07 PROCEDURE — 93308 TTE F-UP OR LMTD: CPT

## 2020-09-07 PROCEDURE — 85730 THROMBOPLASTIN TIME PARTIAL: CPT | Performed by: EMERGENCY MEDICINE

## 2020-09-07 PROCEDURE — 00000146 ZZHCL STATISTIC GLUCOSE BY METER IP

## 2020-09-07 PROCEDURE — 86901 BLOOD TYPING SEROLOGIC RH(D): CPT | Performed by: EMERGENCY MEDICINE

## 2020-09-07 PROCEDURE — 86900 BLOOD TYPING SEROLOGIC ABO: CPT | Performed by: EMERGENCY MEDICINE

## 2020-09-07 PROCEDURE — G0390 TRAUMA RESPONS W/HOSP CRITI: HCPCS

## 2020-09-07 PROCEDURE — 32160 OPEN CHEST HEART MASSAGE: CPT | Mod: CA | Performed by: EMERGENCY MEDICINE

## 2020-09-07 PROCEDURE — 40000256 ZZH STATISTIC CARDIOPULM RESUSCITATION

## 2020-09-07 RX ORDER — NOREPINEPHRINE BITARTRATE 0.06 MG/ML
INJECTION, SOLUTION INTRAVENOUS
Status: DISCONTINUED
Start: 2020-09-07 | End: 2020-09-07 | Stop reason: HOSPADM

## 2020-09-07 RX ORDER — CEFTRIAXONE 2 G/1
2 INJECTION, POWDER, FOR SOLUTION INTRAMUSCULAR; INTRAVENOUS ONCE
Status: DISCONTINUED | OUTPATIENT
Start: 2020-09-07 | End: 2020-09-07

## 2020-09-07 RX ORDER — LORAZEPAM 2 MG/ML
INJECTION INTRAMUSCULAR
Status: DISCONTINUED
Start: 2020-09-07 | End: 2020-09-07 | Stop reason: WASHOUT

## 2020-09-07 RX ORDER — IOPAMIDOL 755 MG/ML
80 INJECTION, SOLUTION INTRAVASCULAR ONCE
Status: DISCONTINUED | OUTPATIENT
Start: 2020-09-07 | End: 2020-09-07 | Stop reason: HOSPADM

## 2020-09-07 RX ORDER — NALOXONE HYDROCHLORIDE 1 MG/ML
INJECTION INTRAMUSCULAR; INTRAVENOUS; SUBCUTANEOUS
Status: DISCONTINUED
Start: 2020-09-07 | End: 2020-09-07 | Stop reason: HOSPADM

## 2020-09-07 ASSESSMENT — ENCOUNTER SYMPTOMS
BACK PAIN: 1
DIAPHORESIS: 1
SHORTNESS OF BREATH: 1

## 2020-09-07 NOTE — ED TRIAGE NOTES
Pt ingested and smoked meth about an hr ago. Upon arrival to ED pt started seizing and went unresponsive.

## 2020-09-07 NOTE — ED NOTES
Bed: ED09  Expected date: 9/7/20  Expected time: 7:18 AM  Means of arrival: Ambulance  Comments:  bv2

## 2020-09-07 NOTE — ED PROVIDER NOTES
"  History     Chief Complaint: Drug Overdose       The history is limited by the condition of the patient.      Jf Fernando is a 30 year old male who presents after a drug overdose. The patient reports smoking and eating methamphetamine prior to arrival in the emergency department approximately one hour ago. He states that he has used methamphetamine plenty of times prior to this, but this time felt much different than in the past. He is diaphoretic, describing pain in the center of his spine \"wrapping around the front\", feels short of breath, and describes muscle cramps in various places on his body, particularly his legs. He denies use of other drugs in this sitting.    EMS run sheet notes admitted that he mentioned on arrival in route that he felt a popping in his chest when he first started to have symptoms.  There was no reported or witnessed trauma penetrating or blunt.  The person who called EMS did not leave their name and contact information.    Blood sugar by medics was normal.     Allergies:  Ketorolac     Medications:    Neurontin  Hydroxyzine    Past Medical History:    Renal cyst  Anxiety  Chronic Pain  Amphetamine use disorder, severe  PTSD  Substance-induced psychotic disorder  Bipolar disorder  Depression  Marijuana use  Suicidal Ideation  ADHD    Past Surgical History:    History reviewed. The patient does not have any past pertinent medical history.     Family History:    History reviewed. No pertinent family history.      Social History:  Smoking Status: Current Every Day Smoker  Smokeless Tobacco: Never Used  Alcohol Use: Not Currently  Drug Use: Yes; Marijuana, Methamphetamine      Review of Systems   Unable to perform ROS: Mental status change   Constitutional: Positive for diaphoresis.   Respiratory: Positive for shortness of breath.    Cardiovascular: Positive for chest pain.   Musculoskeletal: Positive for back pain.         Physical Exam     Patient Vitals for the past 24 hrs:   Pulse " SpO2 Weight   09/07/20 0825 -- (!) 43 % --   09/07/20 0820 -- (!) 59 % --   09/07/20 0749 -- -- 90 kg (198 lb 6.6 oz)   09/07/20 0745 111 94 % --   09/07/20 0740 80 90 % --         Physical Exam  Gen: Uncomfortable appearing, diaphoretic  HEENT: Pupils 5 mm bilaterally no nystagmus, no rhinorrhea  Neck: supple, no abnormal swelling  Lungs: Tachypneic, speaking in short sentences  CV: Tachycardic, weak peripheral pulses  Abd: soft, nontender, nondistended, no rebound/masses/guarding/hsm  Ext: no peripheral edema, no obvious soft tissue trauma, major joint effusion or penetrating injuries on a full skeletal survey unclothed  Skin: Diaphoretic, cool extremities, pale  Neuro: Awake, following simple commands, speech clear, moving all extremities equally without focal deficit on arrival  Psych: Normal mood, normal affect      Within the first couple of minutes on arrival in the ED the patient had a witnessed seizure by myself and the nurse in the room with bilateral upper extremity flexor posturing and extended lower extremities.  He then became apneic pulseless.      Emergency Department Course   Imaging:    XR Chest Port 1 View  IMPRESSION: Portable chest. Left lung is well expanded and clear. Heart is normal in size.  Large right pleural effusion is present causing compressive atelectasis of the right lung. Possible mild mass effect upon the mediastinum by the right pleural effusion.   Endotracheal tube has been placed and the tip is approximately 2 cm from the aileen. No definite evidence of pneumothorax on this supine view. No significant left pleural fluid. Ribs appear intact where visualized.  As read by Radiology.     PROCEDURE NOTE --> Emergency Bedside Ultrasound    Procedure Name: EFAST    Preformed by: Ronald Patton MD    Indication - cardiac arrest  Probe: low frequency curvilinear probe, phased array probe    Windows - Hepatorenal, Splenorenal, Suprapubic, Subxyphoid, PSLA, bilateral lung  "windows    Findings - No intraperitoneal free fluid, No pericardial effusion, no AAA,  + R PLEURAL EFFUSION    Impression - Positive Efast --> LARGE R PLEURAL EFFUSION    Exam preformed emergently and unable to correlate with Epic order       Laboratory:    Blood Culture x2: Pending    (0744) Troponin I: 0.043     (0744) Lactic Acid: 18.0 (HH)    PTT: 56 (H)    INR: 2.06 (H)    Hepatic Panel: Albumin 2.9 (L), Protein total 6.2 (L), o/w WNL    CBC: WBC 12.3 (H), HGB 10.3 (L), PLT 59 (L)     BMP: Glucose 280 (H), carbon dioxide 13 (L), anion gap 22 (H), Creatinine 1.52 (H), GFR 60 (L), o/w WNL    ABO/Rh Type and Screen: O Positive    (0734) Glucose by meter: 196 (H)    Blood Gas Venous and Oxyhgb: pH 6.78 (LL), PCO2 85 (HH), Bicarbonate 12 (L), o/w WNL    Procedures:     Intubation      INDICATION: Acute respiratory failure.    PERFORMED BY: Ronald Patton MD      CONSENT: The procedure was performed in an emergent situation.    TIMEOUT: Immediately prior to procedure a \"time out\" was called to verify the correct patient, procedure, equipment, support staff and site/side marked as required.    INTUBATION METHOD: Glidescope    PATIENT STATUS: Unconscious    PREOXYGENATION: Mask    PRETREATMENT MEDICATIONS: None    SEDATIVES: Etomidate    PARALYTIC: Succinylcholine    LARYNGOSCOPE SIZE: Mac 3    TUBE SIZE: 7.5 cuffed with cuff inflated after placement  Number of attempts: 1  Cricoid pressure: yes  Cords visualized: yes    POST-PROCEDURE ASSESSMENT: Breath sounds equal bilaterally with chest rise and absent over the epigastrium and Chest x-ray interpreted by me demonstrating endotracheal tube in appropriate position.  Capnography    ETT TO TEETH: 24 cm  Tube secured with: ETT carlisle    Patient tolerated the procedure well with no immediate complications.  COMPLICATIONS:  None        Central Line Placement     Procedure:  Central Line Placement with Ultrasound Guidance.    Indications: Vascular access    Consent:  " Risks (including but not limited to: pneumothorax,bleeding, infection or artery puncture), benefits and alternatives were discussed with  unable to obtain due to emergency conditions and consent for procedure was obtained.    Timeout:  not performed given acuity    Procedure note:  Right Internal Jugular approach was selected and the right anterior neck was cleansed.  Mask,  gloves were used NOT full sterile given acuity.  Patient was then placed into Trendelenburg position and lidocaine was used for local anesthesia.  Vascular probe with ultrasound was used in a sterile fashion for guidance.  Introducer needle was then used to gain access to the central venous circulation.  Using Seldinger technique the  Single lumen 18 g catheter was placed.  Catheter port(s) were aspirated and flushed.  Appropriate placement was confirmed by x-ray and no pneumothorax was visualized .    Patient Status:  No complications related to line placement       Central Line Placement     Procedure:  Central Line Placement with Ultrasound Guidance.    Indications: Vascular access    Consent:  Risks (including but not limited to: pneumothorax,bleeding, infection or artery puncture), benefits and alternatives were discussed with  unable to obtain due to emergency conditions and consent for procedure was obtained.    Timeout:  Universal protocol was followed. TIME OUT conducted just prior to starting procedure confirmed patient identity, site/side, procedure, patient position, and availability of correct equipment and implants.?  No - acuity     Procedure note:  Left Femoral and left femoral area was cleansed.  Mask,and gloves were used - not full sterile given acuity of situation.  Patient was then placed into Trendelenburg position and lidocaine was used for local anesthesia.  Vascular probe with ultrasound was used in a sterile fashion for guidance.  Introducer needle was then used to gain access to the central venous circulation.  Using  Seldinger technique the  Triple lumen catheter was placed.  Catheter port(s) were aspirated and flushed.  Central line was sutured in place and sterile dressing applied.   Appropriate placement was confirmed by x-ray and no pneumothorax was visualized .    Patient Status:  No complications           Arterial Line Insertion      INDICATION: Continuous blood pressure monitoring    ANESTHESIA:  1% lidocaine    CONSENT:   Risks (including but not limited to: bleeding, infection or pain), benefits, and alternatives were discussed with unable to obtain due to emergency conditions and consent for procedure was obtained.      [employer] protocol was followed. TIME OUT conducted just prior to    starting the procedure confirmed patient identity, site/side, procedure,    patient position, and availability of correct equipment and implants. No - given acuity, groin scrubbed with chloraprep, sterile gloves used, no drape.    The skin overlying the left femoral artery was prepped and draped in a sterile fashion. The artery was entered with a finder needle through which a guidewire was inserted. The needle was then removed and a 5 cm arterial cannula was inserted over the guidewire without difficulty. The guidewire was removed and the catheter was sutured to the underlying skin. The patient tolerated the procedure well and there were no immediate complications.    PATIENT STATUS: no complication     Interventions:  0737 Etomidate 20 mg IV    0737 Succinylcholine 200 mg IV    0740 Levophed 1 mg IV    0745 Levophed 1 mg IV    Emergency Department Course:  The patient arrived in the emergency department via EMS.   Past medical records, nursing notes, and vitals reviewed.    0725 I performed an exam of the patient and obtained history, as documented above.    0732: The patient began seizing and became unresponsive.    0734: Compressions started.    0736: Dr. Ayers placed an intraosseous line.    0740: Pulse check. No pulse  identified, CPR resumed.    0741: I intubated the patient, as detailed above    0742: I placed a central line, as detailed above.    0747: Chest pulse identified.    0749: Right carotid pulse identified.    0814: Full trauma activation called.    0835: Surgeon arrival in the emergency department.    Dr. Ayers performed a thoracotomy on the right side of the patient's chest and extended incision across with the general saw to a clamshell. Dr. Galan performed the left-sided thoracotomy.    0846: Time of death.    Impression & Plan         Medical Decision Makin-year-old male presenting via EMS after reported methamphetamine ingestion complaining of chest pain. On arrival, he appears uncomfortable, and is diaphoretic with cool/pale extremities.  Within the first couple of minutes of ED arrival the patient had a seizure followed by apnea and went into cardiac arrest.  He was transferred from room 9 to room 3 which is a resuscitation room supplemental oxygen applied, CPR started, and red team activated.      Unsuccessful with initial IV attempt and so a right IO was placed to give epi and induction meds and then ultrasound-guided right IJ and then eventually a left femoral triple-lumen CVC and arterial line.  Initial rhythm was asystole and standard ACLS medications were given.  Subsequent pulse checks he would have transient periods of return of spontaneous circulation that was epi responsive and then would [place] down and lose a perfusing pulse and CPR would be restarted.  At no point did he have a shockable rhythm.      Bedside ultrasound, done shortly after arrival to resus room and cpr started,  showed no pericardial fluid/tamponade nor did it show aneurysmal aorta or intraperitoneal free fluid.  It did show fluid in the right chest (CXR also showed white out R gareth-thorax).  We did not see any external blunt or penetrating trauma on his person when he was fully unclothed and rolled.  As the code proceeded,  given the absence of other obvious etiologies or reversible causes, I had Dr. Ayers preform a thoracostomy on the right side which returned grossly bloody.  Given the massive hemothorax resulting in hemorrhagic shock and cardiac arrest decision made at this point to do a right thoracotomy in an attempt to find the source of the bleeding.  Upon initial aspiration of gross blood from the R chest we called for 2 units of O neg blood. The right chest was full of blood and large clots.  At this point we activated massive transfusion protocol.  Despite evacuation of hematoma and visualization we could not see a source of bleeding.  Clamping the hilum not give us source control and so a left thoracotomy/clamshell was done.  the pericardium was incised and the heart was delivered.  There was no pericardial effusion nor was there any ventricular or other perforation noted on the heart.  The left chest he did not have hemorrhage/hematoma present.     Unfortunately we could not find a source to reverse and he remained in asystole and at this point the code was called and time of death was 0846.  We did not see in our medical record or on the patient's belongings any next of kin or contacts to notify.  Organ donation and medical examiner were notified.  He has a phone and we are unable to unlock the phone or bypassed the screen to find next of kin.    In summary it appears that Jf unfortunately  of a spontaneous right intrathoracic bleed causing hemorrhagic shock and cardiac arrest.  Etiology is unclear but it appears that this was spontaneous and atraumatic given our exam findings here and are limited initial history and the EMS history.      Critical Care time was 37 minutes for this patient excluding procedures.        Diagnosis:    ICD-10-CM    1. Cardiac arrest (H)  I46.9 Blood component     Blood component     Blood component     Blood component   2. Hemorrhagic shock (H)  R57.8        Disposition:      Scribe Disclosure:  I, Michelle Lee, am serving as a scribe at 8:02 AM on 2020 to document services personally performed by Ronald Patton MD, based on my observations and the provider's statements to me.    Michelle Lee  20  [facility] Emergency Department     Ronald Patton MD  [phone]

## 2020-09-07 NOTE — CONSULTS
Full trauma activation at 8:14 am for cardiac arrest with right hemothorax.  No known trauma.  Meth use preceded arrest.    Surgery arrival at 8:34 am, patient already .    Irina Head MD

## 2020-09-07 NOTE — PROGRESS NOTES
RT: Patient intubated with size 7.5 ETT, secured 24 @ the lip. +Color change, CXR, and bilat BS confirmed ETT placement. Ambu bag with PEEP +10 and ResQPOD used during resuscitation.    Code called by MD.

## 2020-09-07 NOTE — ED PROVIDER NOTES
Emergency Department Attending Procedure Note:  9/7/2020  8:41 AM          [facility] Emergency Medicine Procedure Note:    Procedure:  Intraosseous Line Placement right tibia  Indications:  Need for emergent central access  Consent: Emergency consent due to critical nature of patients illness  Description of Procedure: The right tibia was prepped and draped in usual sterile fashion. 1% lidocaine with epinephrine was used to numb skin surface.  Chloraprep used.  Using the EZ-IO drill, the adult needle was gentle advanced with continuous pressure until loss of resistance was felt and the hub was positioned just flush with skin.  Marrow was aspirated.  No complications.           [facility] Emergency Medicine Procedure Note  PHYSICIAN: Jovanni Ayers MD, Emergency Medicine  PROCEDURE:  Left chest tube thoracostomy tube placement  INDICATIONS: Cardiac arrest fluid in right chest  CONSENT: Emergency procedure, implied consent    MEDICATION: none, cardiac arrest  DESCRIPTION OF PROCEDURE:  The left chest was prepped and draped in sterile fashion. Skin cleansed w/ chloraprep.  Using sterile procedure including mask, gown, gloves, I made a 1.5cm incision in midaxillary line in area previously anesthetized.  Using curved forceps I entered bluntly over a rib with audible rush of blood,  Blood continued to pour out and decision made to proceed with thoracotomy, see below.     [facility] Emergency Medicine Procedure Note:    Procedure: Right chest thoracotomy with extension of thoracotomy using a sternal saw across the sternum  Indications: Cardiac arrest with right hemothorax  Consent: Emergency consent due to critical nature of patients illness  Description of Procedure: The chest was quickly cleansed with ChloraPrep.  A large incision was made inferior to the nipple line from anterior to posterior through the chest wall fat and down to the ribs.  There is already a thoracostomy hole from an attempted chest  tube and I used scissors to cut through the costal muscles to extend the incision posteriorly and anteriorly.  All the way to the sternum.  Upon entering the chest a large amount of blood and clots was evacuated.  The posterior chest was filling up with blood but I could not find the source of bleeding.  While Dr. Galan performed a left thoracotomy for access to the heart and pericardium, I called for a sternal saw and extended the thoracotomy across the sternum to allow us to open up the chest completely.  Dr. Galan was performing cardiac massage while I inspected all structures.  I could not find any cause of the hemothorax.  There is no bleeding vessel that could be identified.  There is no pericardial blood or effusion.  Given that no readily identifiable fixable lesion was identified, death was declared and the medical examiner notified.        Jovanni Ayers MD  Emergency Medicine Physician  [facility]             Jovanni Ayers MD  09/07/20 [id number]

## 2020-09-07 NOTE — ED PROVIDER NOTES
Left Sided Thoracotomy Procedure Note:     Procedure:  Left thoracotomy    Indications: Cardiac arrest, hemorrhagic shock    Performed by: Alexis Galan MD    Anesthesia:  Patient intubated.  Procedure performed in emergent manner    Consent: Procedure performed in emergent condition    Procedure note: Patient was intubated and in cardiac arrest at the time this procedure was performed.  Thoracotomy had been performed to the right chest without clearly identifiable source of hemorrhagic shock.  Decision was then made to perform left sided thoracotomy.  Using an #11 blade scalpel, a incision was made extending from the sternum, laterally to the axillary line.  Dissection was made down to the intercostal muscles, and the lung cavity was entered.  A rib  was placed at the anterior axillary line, and the left thorax was opened.  Pulmonary tissue was visualized, as well as the pericardial sac. The pericardium was opened using Broussard scissors, and the heart was delivered through the pericardial sac.  No blood was noted within the pericardial sac, nor signs of penetrating cardiac injury.  Given persistent cardiac arrest, manual cardiac massage was initiated and resumed for remainder of resuscitation.         Alexis Galan MD  09/07/20 2080